# Patient Record
Sex: MALE | Race: WHITE
[De-identification: names, ages, dates, MRNs, and addresses within clinical notes are randomized per-mention and may not be internally consistent; named-entity substitution may affect disease eponyms.]

---

## 2019-02-08 ENCOUNTER — HOSPITAL ENCOUNTER (EMERGENCY)
Dept: HOSPITAL 52 - LL.ED | Age: 24
Discharge: HOME | End: 2019-02-08
Payer: MEDICAID

## 2019-02-08 DIAGNOSIS — S61.210A: Primary | ICD-10-CM

## 2019-02-08 DIAGNOSIS — Z23: ICD-10-CM
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
## 2019-02-08 PROCEDURE — 99283 EMERGENCY DEPT VISIT LOW MDM: CPT

## 2019-02-08 PROCEDURE — 90714 TD VACC NO PRESV 7 YRS+ IM: CPT

## 2019-02-08 PROCEDURE — 90471 IMMUNIZATION ADMIN: CPT

## 2019-02-08 PROCEDURE — 12001 RPR S/N/AX/GEN/TRNK 2.5CM/<: CPT

## 2019-02-08 NOTE — EDM.PDOC
ED HPI GENERAL MEDICAL PROBLEM





- General


Chief Complaint: Laceration


Stated Complaint: laceration


Time Seen by Provider: 02/08/19 19:18


Source of Information: Reports: Patient


History Limitations: Reports: No Limitations





- History of Present Illness


INITIAL COMMENTS - FREE TEXT/NARRATIVE: 





Patient cut self on right middle finger while working on truck tonight.  No 

other injury.  Function intact.  No numbness. 


  ** Right Finger-Middle


Pain Score (Numeric/FACES): 2





- Related Data


 Allergies











Allergy/AdvReac Type Severity Reaction Status Date / Time


 


No Known Allergies Allergy   Verified 02/08/19 18:58











Home Meds: 


 Home Meds





. [No Known Home Meds]  02/08/19 [History]











Past Medical History





- Past Health History


Medical/Surgical History: Denies Medical/Surgical History





Social & Family History





- Tobacco Use


Smoking Status *Q: Never Smoker





- Caffeine Use


Caffeine Use: Reports: Coffee





- Alcohol Use


Days Per Week of Alcohol Use: 1


Number of Drinks Per Day: 2


Total Drinks Per Week: 2





- Recreational Drug Use


Recreational Drug Use: No





ED ROS GENERAL





- Review of Systems


Review Of Systems: ROS reveals no pertinent complaints other than HPI.





ED EXAM, SKIN/RASH


Exam: See Below


Exam Limited By: No Limitations


General Appearance: Alert, WD/WN, No Apparent Distress


Eye Exam: Bilateral Eye: EOMI


Throat/Mouth: Normal Voice, No Airway Compromise


Head: Atraumatic, Normocephalic


Neck: Supple


Respiratory/Chest: No Respiratory Distress


Extremities: Normal Range of Motion (Tendon function appears to be intact), 

Normal Capillary Refill, Other (laceration dorsal right middle finger proximally

)


Neurological: Alert, Oriented, Normal Cognition, Normal Gait, No Motor/Sensory 

Deficits


Psychiatric: Normal Affect, Normal Mood


Skin: Warm, Dry, Normal Color, Wound/Incision





ED SKIN PROCEDURES





- Laceration/Wound Repair


  ** Right Dorsal Digit - 3rd (Middle)


Lac/Wound length In cm: 2.5


Appearance: Linear, Clean


Distal NVT: Neuro & Vascular Intact, No Tendon Injury


Anesthetic Type: Local


Local Anesthesia - Lidocaine (Xylocaine): 1% Plain


Exploration/Debridement/Repair: Wound Explored, In a Bloodless Field, Explored 

to Base, No Foreign Material Found


Closed with: Sutures


Suture Size: 4-0


# of Sutures: 3


Suture Type: Nylon, Interrupted


Sterile Dressing Applied: Nurse


Tetanus Status Addressed: Yes


Complications: No





Course





- Vital Signs


Last Recorded V/S: 


 Last Vital Signs











Temp  37.2 C   02/08/19 19:11


 


Pulse  83   02/08/19 19:11


 


Resp  16   02/08/19 19:11


 


BP  137/73   02/08/19 19:11


 


Pulse Ox  100   02/08/19 19:11














- Orders/Labs/Meds


Orders: 


 Active Orders 24 hr











 Category Date Time Status


 


 Vaccines to be Administered [RC] PER UNIT ROUTINE Care  02/08/19 19:43 Ordered











Meds: 


Medications














Discontinued Medications














Generic Name Dose Route Start Last Admin





  Trade Name Laura  PRN Reason Stop Dose Admin


 


Lidocaine HCl  5 ml  02/08/19 19:44  02/08/19 19:50





  Xylocaine-Mpf 1%  INJECT  02/08/19 19:45  5 ml





  ONETIME ONE   Administration





     





     





     





     


 


Neomycin/Polymyxin/Bacitracin  1 each  02/08/19 20:10  





  Triple Antibiotic Oint  TOP  02/08/19 20:11  





  ONETIME ONE   





     





     





     





     


 


Tetanus/Diphtheria Toxoids  0.5 ml  02/08/19 19:43  02/08/19 19:49





  Tenivac  IM  02/08/19 19:44  0.5 ml





  .ONCE ONE   Administration





     





     





     





     














- Re-Assessments/Exams


Free Text/Narrative Re-Assessment/Exam: 





02/08/19 20:19


Laceration repaired. Wound care reviewed. Tetanus updated. 





Departure





- Departure


Time of Disposition: 20:11


Disposition: Home, Self-Care 01


Condition: Good


Clinical Impression: 


Laceration of right index finger


Qualifiers:


 Encounter type: initial encounter Damage to nail status: without damage 

Foreign body presence: without foreign body Qualified Code(s): S61.210A - 

Laceration without foreign body of right index finger without damage to nail, 

initial encounter








- Discharge Information


*PRESCRIPTION DRUG MONITORING PROGRAM REVIEWED*: Not Applicable


*COPY OF PRESCRIPTION DRUG MONITORING REPORT IN PATIENT SRIDEVI: Not Applicable


Instructions:  Sutured Wound Care, Easy-to-Read


Forms:  ED Department Discharge


Additional Instructions: 


Keep wound clean. Follow up as needed if any problems or signs of infection 

develop. Sutures out in 8-10 days as discussed. 





- My Orders


Last 24 Hours: 


My Active Orders





02/08/19 19:43


Vaccines to be Administered [RC] PER UNIT ROUTINE 














- Assessment/Plan


Last 24 Hours: 


My Active Orders





02/08/19 19:43


Vaccines to be Administered [RC] PER UNIT ROUTINE